# Patient Record
Sex: MALE | Race: BLACK OR AFRICAN AMERICAN | Employment: FULL TIME | URBAN - METROPOLITAN AREA
[De-identification: names, ages, dates, MRNs, and addresses within clinical notes are randomized per-mention and may not be internally consistent; named-entity substitution may affect disease eponyms.]

---

## 2019-10-12 ENCOUNTER — HOSPITAL ENCOUNTER (EMERGENCY)
Facility: CLINIC | Age: 44
Discharge: HOME | End: 2019-10-12
Attending: NURSE PRACTITIONER | Admitting: NURSE PRACTITIONER

## 2019-10-12 VITALS
RESPIRATION RATE: 18 BRPM | DIASTOLIC BLOOD PRESSURE: 74 MMHG | TEMPERATURE: 98 F | SYSTOLIC BLOOD PRESSURE: 136 MMHG | OXYGEN SATURATION: 98 %

## 2019-10-12 DIAGNOSIS — L02.91 ABSCESS: ICD-10-CM

## 2019-10-12 DIAGNOSIS — M79.622 LEFT AXILLARY PAIN: ICD-10-CM

## 2019-10-12 DIAGNOSIS — L03.112 CELLULITIS OF LEFT AXILLA: ICD-10-CM

## 2019-10-12 PROCEDURE — 99283 EMERGENCY DEPT VISIT LOW MDM: CPT | Mod: 25

## 2019-10-12 PROCEDURE — 10060 I&D ABSCESS SIMPLE/SINGLE: CPT

## 2019-10-12 RX ORDER — SULFAMETHOXAZOLE/TRIMETHOPRIM 800-160 MG
1 TABLET ORAL 2 TIMES DAILY
Qty: 20 TABLET | Refills: 0 | Status: SHIPPED | OUTPATIENT
Start: 2019-10-12 | End: 2020-01-03

## 2019-10-12 ASSESSMENT — ENCOUNTER SYMPTOMS
COLOR CHANGE: 1
WEAKNESS: 0
NUMBNESS: 0
FEVER: 0
VOMITING: 0
CHILLS: 0
DIARRHEA: 0
NAUSEA: 0

## 2019-10-12 NOTE — ED AVS SNAPSHOT
Emergency Department  [address] [facility] [address]  Phone:  [phone]  Fax:  [phone]                                    Gio Cespedes   MRN: [id number]    Department:   Emergency Department   Date of Visit:  10/12/2019           After Visit Summary Signature Page    I have received my discharge instructions, and my questions have been answered. I have discussed any challenges I see with this plan with the nurse or doctor.    ..........................................................................................................................................  Patient/Patient Representative Signature      ..........................................................................................................................................  Patient Representative Print Name and Relationship to Patient    ..................................................               ................................................  Date                                   Time    ..........................................................................................................................................  Reviewed by Signature/Title    ...................................................              ..............................................  Date                                               Time          22EPIC Rev 08/18

## 2019-10-12 NOTE — ED PROVIDER NOTES
"  History     Chief Complaint:    Wound Check       HPI   Gio Cespedes is a 44 year old male who presents with left axillary pain and swelling that he noted 3 days ago. He denies any open skin lesions or \"boils\". No history of prior abscesses or known MRSA. He had a [employer] physical 2 weeks ago and he was told he does not have diabetes, although this sounds like it was a limited exam. He does not have a primary care provider or clinic. He notes no pain, swelling, weakness or numbness into the left arm. He denies fever or chills. His signficant other also notes a \"bump\" under his left jaw x 2 years, the patient notes this is not tender, hot to touch or red at any time.     Allergies:  No Known Allergies     Medications:    none    Past Medical History:    History reviewed. No pertinent past medical history.    There are no active problems to display for this patient.       Past Surgical History:    History reviewed. No pertinent surgical history.     Family History:    family history is not on file.    Social History:   reports that he has never smoked. He has never used smokeless tobacco.  He is a .    PCP: No primary care provider on file.     Review of Systems   Constitutional: Negative for chills and fever.   Gastrointestinal: Negative for diarrhea, nausea and vomiting.   Skin: Positive for color change (redsness left axilla).        Swelling and pain left axilla   Neurological: Negative for weakness and numbness.   All other systems reviewed and are negative.        Physical Exam     Patient Vitals for the past 24 hrs:   BP Temp Temp src Heart Rate Resp SpO2   10/12/19 1629 -- -- -- -- 18 --   10/12/19 1554 136/74 98  F (36.7  C) Oral 85 16 98 %        Physical Exam     Physical Exam   Constitutional:  Laying flat in bed on his back with left arm over his head. Appears non-toxic.   Head: Head moves freely with normal range of motion.   ENT: Oropharynx is clear and " moist. There is a soft, non-fluctuant mass under the left jaw that is non tender and has no erythema or heat to touch.   Eyes: Conjunctivae pink. EOMs intact.   Neck: Normal range of motion.   Cardiovascular: Regular rate and rhythm. Normal heart sounds. No concerning murmur. Intact distal pulses: radial pulses 2+ on the right, 2+ on the left.   Pulmonary/Chest: No respiratory distress. No decreased breath sounds. No wheezes. No rhonchi. No rales. Lungs clear throughout.   Abdominal: Soft. Non-tender. No rebound, no guarding.   Musculoskeletal: No peripheral edema. Distal capillary refill and sensation intact.   Neurological: Oriented to person, place, and time. No focal deficits.   Skin: Left axilla with tenderness, fluctuance and erythema that measures about 3cm x 3cm with extending erythema posteriorly.       Emergency Department Course       Procedures:      Procedure: Incision and Drainage     LOCATIONS:  Left axilla    ANESTHESIA:  Local field block using Lidocaine 1% with epinephrine, total of 2 mLs    PREPARATION:  Cleansed with alcohol    PROCEDURE:  Area was incised with # 11 Blade (Sharp Point) with a Single Straight incision.  Wound treatment included Purulent Drainage.  Packing consisted of No Packing.  Appropriate dressing was applied to cover the area.    Patient Status: Patient tolerated the procedure well. There were no complications.        Emergency Department Course:  Past medical records, nursing notes, and vitals reviewed.    I performed an exam of the patient and obtained history, as documented above.    I rechecked the patient. Findings and plan explained to the Patient and his significant other.    Impression & Plan       Medical Decision Making:  Gio Cespedes is a 44 year old male with pain and swelling to left axilla. Exam consistent with abscess with some extending cellulitis posteriorly. He is afebrile and non-toxic appearing and I have no concerns for sepsis. Normal vital  "signs. I & D performed with copious amounts of purulent drainage and he notes significant improvement in pain after I & D. Dressing applied after. We discussed oral antibiotics given he has some extending erythema/cellulitis. We also discussed warm compresses frequently and need for PCP. His significant other also noted a \"bump\" under his left jaw x 2 years, on exam this seems consistent with a lipoma and we discussed follow up with ENT, contact information given. We reviewed reasons to return here and need for wound recheck in 3 days. He is amenable to plan.          Diagnosis:    ICD-10-CM    1. Left axillary pain M79.622    2. Abscess L02.91    3. Cellulitis of left axilla L03.112         Discharge Medications:     Medication List      Started    sulfamethoxazole-trimethoprim 800-160 MG tablet  Commonly known as:  BACTRIM DS  1 tablet, Oral, 2 TIMES DAILY             10/12/2019     JOSÉ Yuan Laura Kristen Guilday, APRN CNP  10/12/19 8861    "

## 2019-10-12 NOTE — DISCHARGE INSTRUCTIONS
Because of the extending redness I will also prescribed antibiotics. Take these as directed.     You have several ongoing concerns that you should see a primary provider in clinic for, see list below.       [facility]  [address] [facility] [phone]   [facility]  [address]  [phone]   [facility]     [address] [phone]     [facility]     [address] [phone]      Critical access hospital    Clinic, [address] [facility]  [phone]       [facility]     [address] [phone]   [facility]    [address] [facility] [phone]   [facility]     [address] [facility] [phone]     [facility]   [address] [phone]     [facility]     [address] [facility] [phone]   [facility]:    [address] [facility] [phone]   [facility]:    [address] [facility] [phone]   [facility]   [address] [place] [address] [phone]     [facility]     [address] [facility] [phone]   [facility]     [address] [facility] [phone]   [facility]     [address] [place] [address] [phone]   [facility]     [address] [facility] [phone]     Family planning and reproductive health centers  Centros de planificación familiar y luma reproductiva    [facility]              [phone]  [facility]               [phone]  [facility]   [phone]  [facility] [place]   [phone]  [facility]  [phone]   [facility]                   [phone]

## 2020-01-03 ENCOUNTER — HOSPITAL ENCOUNTER (EMERGENCY)
Facility: CLINIC | Age: 45
Discharge: HOME | End: 2020-01-03
Attending: EMERGENCY MEDICINE | Admitting: EMERGENCY MEDICINE

## 2020-01-03 ENCOUNTER — APPOINTMENT (OUTPATIENT)
Dept: GENERAL RADIOLOGY | Facility: CLINIC | Age: 45
End: 2020-01-03
Attending: EMERGENCY MEDICINE

## 2020-01-03 VITALS
RESPIRATION RATE: 20 BRPM | OXYGEN SATURATION: 96 % | DIASTOLIC BLOOD PRESSURE: 82 MMHG | BODY MASS INDEX: 45.52 KG/M2 | SYSTOLIC BLOOD PRESSURE: 142 MMHG | WEIGHT: 290 LBS | HEIGHT: 67 IN | TEMPERATURE: 97.8 F

## 2020-01-03 DIAGNOSIS — J20.9 BRONCHITIS WITH BRONCHOSPASM: ICD-10-CM

## 2020-01-03 DIAGNOSIS — R06.09 EXERTIONAL DYSPNEA: ICD-10-CM

## 2020-01-03 DIAGNOSIS — R73.9 HYPERGLYCEMIA: ICD-10-CM

## 2020-01-03 LAB
ALBUMIN SERPL-MCNC: 3.2 G/DL (ref 3.4–5)
ALP SERPL-CCNC: 85 U/L (ref 40–150)
ALT SERPL W P-5'-P-CCNC: 42 U/L (ref 0–70)
ANION GAP SERPL CALCULATED.3IONS-SCNC: 5 MMOL/L (ref 3–14)
AST SERPL W P-5'-P-CCNC: 21 U/L (ref 0–45)
BASOPHILS # BLD AUTO: 0 10E9/L (ref 0–0.2)
BASOPHILS NFR BLD AUTO: 0.2 %
BILIRUB SERPL-MCNC: 0.3 MG/DL (ref 0.2–1.3)
BUN SERPL-MCNC: 11 MG/DL (ref 7–30)
CALCIUM SERPL-MCNC: 8.9 MG/DL (ref 8.5–10.1)
CHLORIDE SERPL-SCNC: 103 MMOL/L (ref 94–109)
CO2 SERPL-SCNC: 29 MMOL/L (ref 20–32)
CREAT SERPL-MCNC: 0.66 MG/DL (ref 0.66–1.25)
DIFFERENTIAL METHOD BLD: ABNORMAL
EOSINOPHIL # BLD AUTO: 0.1 10E9/L (ref 0–0.7)
EOSINOPHIL NFR BLD AUTO: 1.3 %
ERYTHROCYTE [DISTWIDTH] IN BLOOD BY AUTOMATED COUNT: 13.2 % (ref 10–15)
GFR SERPL CREATININE-BSD FRML MDRD: >90 ML/MIN/{1.73_M2}
GLUCOSE SERPL-MCNC: 185 MG/DL (ref 70–99)
HCT VFR BLD AUTO: 39.3 % (ref 40–53)
HGB BLD-MCNC: 13 G/DL (ref 13.3–17.7)
IMM GRANULOCYTES # BLD: 0 10E9/L (ref 0–0.4)
IMM GRANULOCYTES NFR BLD: 0.6 %
INTERPRETATION ECG - MUSE: NORMAL
LYMPHOCYTES # BLD AUTO: 1.9 10E9/L (ref 0.8–5.3)
LYMPHOCYTES NFR BLD AUTO: 36.1 %
MCH RBC QN AUTO: 28.5 PG (ref 26.5–33)
MCHC RBC AUTO-ENTMCNC: 33.1 G/DL (ref 31.5–36.5)
MCV RBC AUTO: 86 FL (ref 78–100)
MONOCYTES # BLD AUTO: 0.5 10E9/L (ref 0–1.3)
MONOCYTES NFR BLD AUTO: 8.8 %
NEUTROPHILS # BLD AUTO: 2.8 10E9/L (ref 1.6–8.3)
NEUTROPHILS NFR BLD AUTO: 53 %
NRBC # BLD AUTO: 0 10*3/UL
NRBC BLD AUTO-RTO: 0 /100
NT-PROBNP SERPL-MCNC: 29 PG/ML (ref 0–450)
PLATELET # BLD AUTO: 324 10E9/L (ref 150–450)
POTASSIUM SERPL-SCNC: 3.8 MMOL/L (ref 3.4–5.3)
PROT SERPL-MCNC: 7.2 G/DL (ref 6.8–8.8)
RBC # BLD AUTO: 4.56 10E12/L (ref 4.4–5.9)
SODIUM SERPL-SCNC: 137 MMOL/L (ref 133–144)
TROPONIN I SERPL-MCNC: <0.015 UG/L (ref 0–0.04)
WBC # BLD AUTO: 5.3 10E9/L (ref 4–11)

## 2020-01-03 PROCEDURE — 94640 AIRWAY INHALATION TREATMENT: CPT

## 2020-01-03 PROCEDURE — 83880 ASSAY OF NATRIURETIC PEPTIDE: CPT | Performed by: EMERGENCY MEDICINE

## 2020-01-03 PROCEDURE — 85025 COMPLETE CBC W/AUTO DIFF WBC: CPT | Performed by: EMERGENCY MEDICINE

## 2020-01-03 PROCEDURE — 25000125 ZZHC RX 250: Performed by: EMERGENCY MEDICINE

## 2020-01-03 PROCEDURE — 84484 ASSAY OF TROPONIN QUANT: CPT | Performed by: EMERGENCY MEDICINE

## 2020-01-03 PROCEDURE — 93005 ELECTROCARDIOGRAM TRACING: CPT

## 2020-01-03 PROCEDURE — 71046 X-RAY EXAM CHEST 2 VIEWS: CPT

## 2020-01-03 PROCEDURE — 80053 COMPREHEN METABOLIC PANEL: CPT | Performed by: EMERGENCY MEDICINE

## 2020-01-03 PROCEDURE — 99285 EMERGENCY DEPT VISIT HI MDM: CPT | Mod: 25

## 2020-01-03 RX ORDER — AZITHROMYCIN 250 MG/1
TABLET, FILM COATED ORAL
Qty: 6 TABLET | Refills: 0 | Status: SHIPPED | OUTPATIENT
Start: 2020-01-03 | End: 2020-01-08

## 2020-01-03 RX ORDER — PREDNISONE 20 MG/1
TABLET ORAL
Qty: 10 TABLET | Refills: 0 | Status: SHIPPED | OUTPATIENT
Start: 2020-01-03

## 2020-01-03 RX ORDER — ALBUTEROL SULFATE 90 UG/1
2 AEROSOL, METERED RESPIRATORY (INHALATION) EVERY 6 HOURS PRN
Qty: 1 INHALER | Refills: 0 | Status: SHIPPED | OUTPATIENT
Start: 2020-01-03

## 2020-01-03 RX ORDER — IPRATROPIUM BROMIDE AND ALBUTEROL SULFATE 2.5; .5 MG/3ML; MG/3ML
3 SOLUTION RESPIRATORY (INHALATION) ONCE
Status: COMPLETED | OUTPATIENT
Start: 2020-01-03 | End: 2020-01-03

## 2020-01-03 RX ADMIN — IPRATROPIUM BROMIDE AND ALBUTEROL SULFATE 3 ML: .5; 3 SOLUTION RESPIRATORY (INHALATION) at 10:15

## 2020-01-03 ASSESSMENT — MIFFLIN-ST. JEOR: SCORE: 2174.06

## 2020-01-03 ASSESSMENT — ENCOUNTER SYMPTOMS
SHORTNESS OF BREATH: 1
FEVER: 1
COUGH: 1

## 2020-01-03 NOTE — ED AVS SNAPSHOT
Emergency Department  [address] [facility] [address]  Phone:  [phone]  Fax:  [phone]                                    Gio Cespedes   MRN: [id number]    Department:   Emergency Department   Date of Visit:  1/3/2020           After Visit Summary Signature Page    I have received my discharge instructions, and my questions have been answered. I have discussed any challenges I see with this plan with the nurse or doctor.    ..........................................................................................................................................  Patient/Patient Representative Signature      ..........................................................................................................................................  Patient Representative Print Name and Relationship to Patient    ..................................................               ................................................  Date                                   Time    ..........................................................................................................................................  Reviewed by Signature/Title    ...................................................              ..............................................  Date                                               Time          22EPIC Rev 08/18

## 2020-01-03 NOTE — ED TRIAGE NOTES
Walking back from triage, patient noted to be breathing harder than at rest, O2 sat 97%, HR 95, RR20.

## 2020-01-03 NOTE — ED PROVIDER NOTES
"  History     Chief Complaint:  Shortness of breath     HPI   Gio Cespedes is a 44 year old male who presents with shortness of breath. 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 At onset he had congestion, head cold, a cough and intermittent \"hot flashes\". He stated that he only has chest pain while coughing. The patient reported that for the last 2 days he has had increased shortness of breath. He has barely been able to sleep as night as while he is laying there he feels like he can't get enough air in. He also reported exertional shortness of breath while walking. Since he has never experienced anything like this before his wife convinced him to present to the ED for evaluation.     Allergies:  The patient has no known drug allergies.    Medications:    The patient is currently on no regular medications.    Past Medical History:    The patient denies any significant past medical history.    Past Surgical History:    The patient does not have any pertinent past surgical history.    Family History:    No past pertinent family history.    Social History:  Current some day smoker   Rare alcohol use   Negative for drug use.  Marital Status:  Single [1]     Review of Systems   Constitutional: Positive for fever.   HENT: Positive for congestion.    Respiratory: Positive for cough and shortness of breath.    Cardiovascular: Positive for chest pain.   All other systems reviewed and are negative.        Physical Exam     Patient Vitals for the past 24 hrs:   BP Temp Temp src Heart Rate Resp SpO2 Height Weight   01/03/20 1200 -- -- -- 70 17 97 % -- --   01/03/20 1130 (!) 142/82 -- -- 64 14 94 % -- --   01/03/20 0943 (!) 178/102 97.8  F (36.6  C) Oral 92 20 97 % 1.702 m (5' 7\") 131.5 kg (290 lb)       Physical Exam  General: Overweight but otherwise well-appearing middle-aged gentleman lying supine on the bed in no " obvious distress.    Eye:  Pupils are equal, round, and reactive.  Extraocular movements intact.    ENT: Mild nasal congestion.  Moist mucus membranes.  Normal tongue and tonsil.    Cardiac:  Regular rate and rhythm.  No murmurs, gallops, or rubs.    Pulmonary:  Clear to auscultation bilaterally.  No wheezes, rales, or rhonchi.  Please note this exam was performed after the patient received a nebulizer treatment which he feels opened his lungs significantly    Abdomen:  Positive bowel sounds.  Abdomen is soft and non-distended, without focal tenderness.    Musculoskeletal:  Normal movement of all extremities without evidence for deficit.  No lower extremity edema or asymmetry    Skin:  Warm and dry without rashes.    Neurologic:  Non-focal exam without asymmetric weakness or numbness.     Psychiatric:  Normal affect with appropriate interaction with examiner.      Emergency Department Course   ECG:  Indication: Shortness of breath   Time: 0951  Vent. Rate 74 bpm. NC interval 158. QRS duration 94. QT/QTc 392/435. P-R-T axis 48 44 59.  normal sinus rhythm. Normal ECG. Read time: 0955    Imaging:  Radiographic findings were communicated with the patient who voiced understanding of the findings.    XR Chest PA & LAT:   Perihilar streakiness. While this could be related to  suboptimal inspiration, atypical pneumonitis cannot be excluded. No  focal pulmonary alveolar consolidation. No pleural effusion, as per radiology.     Laboratory:  CBC: WBC: 5.3, HGB: 13.0 (L), PLT: 324  CMP: Glucose 185 (H), Albumin: 3.2 (L), o/w WNL (Creatinine: 0.66)  BNP: 29   1120 Troponin: <0.015    Interventions:  1015 Duoneb 3 ml Nebulization      Emergency Department Course:  Nursing notes and vitals reviewed. (1030) I performed an exam of the patient as documented above.     IV inserted. Medicine administered as documented above. Blood drawn. This was sent to the lab for further testing, results above.    The patient was sent for a chest XR  while in the emergency department, findings above.   EKG obtained in the ED, see results above.     (1225) I rechecked the patient and discussed the results of his workup thus far.     Findings and plan explained to the Patient. Patient discharged home with instructions regarding supportive care, medications, and reasons to return. The importance of close follow-up was reviewed. The patient was prescribed albuterol, azithromycin, and Deltasone.     I personally reviewed the laboratory results with the Patient and answered all related questions prior to discharge.     Impression & Plan      Medical Decision Making:  This delightful gentleman presents to us with concerns of having a cough for the last several weeks which is getting worse, now causing him to feel short of breath and causing difficulty with sleeping at night due to coughing.  He also feels some shortness of breath with exertion.  Triage note and my nurse note that the patient was initially very wheezy and congested.  He was given a nebulizer treatment per standing orders, and by the time I saw him, he noted he was feeling much better.  He never showed any signs of respiratory distress and his oxygenation was always normal.  His lungs are generally clear for me.  A chest x-ray was obtained which shows some generalized perihilar streaky opacities, likely consistent with a viral syndrome possibly with an atypical pneumonia.    I spoke with the patient and his wife at length.  The wife describes concerns that this could represent a congestive heart failure exacerbation though he does not carry this diagnosis.  She is also concerned about diabetes and request that we obtain some tests with this.  Therefore, laboratory investigation was pursued.  Shows a normal white blood cell count and normal hemoglobin.  His electrolytes and liver function and kidney function are normal.  He shows no evidence of a troponin leak and he has a normal BNP.  However, he does  have an elevated glucose of 185.  This is a nonfasting level and I explained that he will need a fasting level along with an A1c in the future.    With more serious pathology is considered and ruled out, I do feel this is simply a bronchitis with bronchospasm, likely secondary to an atypical pneumonia.  Therefore, he will be prescribed prednisone and azithromycin.  He felt much improvement with his neb and therefore I will prescribe him and an inhaler.  I strongly urged him to pursue lifestyle modifications and to obtain care through her primary doctor.  He was given our local clinic information.  Otherwise, he was advised to return to us immediately if he develops any increased shortness of breath, fever, lightheadedness, chest pain, or any other emergent concerns.    Diagnosis:    ICD-10-CM    1. Bronchitis with bronchospasm J20.9    2. Exertional dyspnea R06.09    3. Hyperglycemia R73.9        Disposition:  discharged to home    Discharge Medications:  New Prescriptions    ALBUTEROL (PROAIR HFA/PROVENTIL HFA/VENTOLIN HFA) 108 (90 BASE) MCG/ACT INHALER    Inhale 2 puffs into the lungs every 6 hours as needed for shortness of breath / dyspnea or wheezing    AZITHROMYCIN (ZITHROMAX Z-SERAFIN) 250 MG TABLET    Two tablets on the first day, then one tablet daily for the next 4 days    PREDNISONE (DELTASONE) 20 MG TABLET    Take two tablets (= 40mg) each day for 5 (five) days     Scribe Disclosure:  I, Damari Alcantar, am serving as a scribe on 1/3/2020 at 10:30 AM to personally document services performed by Trierweiler, Chad A, MD based on my observations and the provider's statements to me.     Damari Alcantar  1/3/2020    EMERGENCY DEPARTMENT       Trierweiler, Chad A, MD  01/03/20 2008

## 2020-01-03 NOTE — LETTER
EMERGENCY DEPARTMENT  [address] [facility] [address]  Phone: [phone]  Fax: [phone]    January 3, 2020        Gio Cespedes  [address]  [place] [facility] [address]          To whom it may concern:    RE: Gio Powers was seen in the Emergency Room today, 1/3/20. I expect his condition to improve over the next several days. Please excuse him from work through 1/5/20 and he can return to work again on 1/6/20.    Please contact me for questions or concerns.      Sincerely,    Dr. Trierweiler